# Patient Record
(demographics unavailable — no encounter records)

---

## 2025-01-28 NOTE — DISCUSSION/SUMMARY
[Medication Risks Reviewed] : Medication risks reviewed [Surgical risks reviewed] : Surgical risks reviewed [de-identified] : 70 year-old female with severe right knee osteoarthritis presenting today for follow up. The patient has had good result with conservative therapy in the past and would like continue with that today. We discussed cortisone vs. viscosupplementation. The patient would like to proceed with cortisone.  I gave her injection of cortisone in the right knee , which she tolerated well. I recommended physical therapy. She should take Tylenol as needed for the pain. She should follow up in 3 months for repeat evaluation possible viscosupplementation.  All addressed, patient in agreement.

## 2025-01-28 NOTE — PROCEDURE
[de-identified] : I injected the right knee  I discussed at length with the patient the planned steroid and lidocaine injection. The risks, benefits, convalescence and alternatives were reviewed. The possible side effects discussed included but were not limited to: pain, swelling, heat, bleeding, and redness. Symptoms are generally mild but if they are extensive then contact the office. Giving pain relievers by mouth such as NSAIDs or Tylenol can generally treat the reactions to steroid and lidocaine. Rare cases of infection have been noted. Rash, hives and itching may occur post injection. If you have muscle pain or cramps, flushing and or swelling of the face, rapid heart beat, nausea, dizziness, fever, chills, headache, difficulty breathing, swelling in the arms or legs, or have a prickly feeling of your skin, contact a health care provider immediately. Following this discussion, the knee was prepped with Alcohol and under sterile condition the 80 mg Depo-Medrol and 6 cc Lidocaine injection was performed with a 20 gauge needle through a superolateral injection site. The needle was introduced into the joint, aspiration was performed to ensure intra-articular placement and the medication was injected. Upon withdrawal of the needle the site was cleaned with alcohol and a band aid applied. The patient tolerated the injection well and there were no adverse effects. Post injection instructions included no strenuous activity for 24 hours, cryotherapy and if there are any adverse effects to contact the office.

## 2025-01-28 NOTE — HISTORY OF PRESENT ILLNESS
[de-identified] : 68-year-old female with past medical history of hypertension, hyperlipidemia, lichen planus, melanoma, which spread to the lymph nodes treated with immune therapy and a stroke (2016) with residual left-sided weakness on Eliquis and past surgical history of left total knee arthroplasty (2014) presents office for follow up of right knee pain and swelling. The patient was last seen in 2022 and received a cortisone injection, which provided good relief. She states that she relies on her RLE for support as it is her stronger leg. This has lead to increase in pain recently. Made worse by standing, rising from a seated position, ambulating and navigating stairs. She ambulates with a cane. Not currently in PT. Takes Tylenol occasionally for pain as she is unable to take NSAIDs due to use of Eliquis. Denies any recent injuries/falls/trauma, neurovascular compromise, locking/catching/buckling in knee.

## 2025-01-28 NOTE — PHYSICAL EXAM
[Cane] : ambulates with cane [de-identified] : Right knee exam shows mild effusion, ROM is 0-105 degrees, no instability, no pain with Gee, and medial lateral joint line tenderness. 5/5 motor strength in bilateral lower extremities. Sensory: Intact in bilateral lower extremities. DTRs: Biceps, brachioradialis, triceps, patellar, ankle and plantar 2+ and symmetric bilaterally. Pulses: dorsalis pedis, posterior tibial, femoral, popliteal, and radial 2+ and symmetric bilaterally. [de-identified] : 4 views of the right knee obtained the office today show no acute fracture or dislocation. There is severe lateral joint space narrowing bone-on-bone osteoarthritis subchondral sclerosis consistent with Kellgren-Hosea grade 4 changes.

## 2025-03-12 NOTE — CONSULT LETTER
[Dear  ___] : Dear  [unfilled], [Courtesy Letter:] : I had the pleasure of seeing your patient, [unfilled], in my office today. [Please see my note below.] : Please see my note below. [Consult Closing:] : Thank you very much for allowing me to participate in the care of this patient.  If you have any questions, please do not hesitate to contact me. [Sincerely,] : Sincerely, [DrMatthew  ___] : Dr. WHITLEY [DrMatthew ___] : Dr. WHITLEY [___] : [unfilled]

## 2025-03-12 NOTE — ASSESSMENT
[With Patient/Caregiver] : With Patient/Caregiver [Designated Health Care Proxy] : Designated Health Care Proxy [Name: ___] : Name: [unfilled] [Relationship: ___] : Relationship: [unfilled] [FreeTextEntry1] : Melanoma of back (172.5) (C43.59) Patient has nodular, non-pigmented mid-back melanoma. Lesion was biopsied on 7/14/21 and is at least 1.2 mm, ulcerated melanoma. Patient had surgery on 10/6/21 with Wide local excisIon WLE of midback and left axilla. T4bN1a melanoma, which is Stage 3C.   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  Last CAT scan 12/2024 negative for relapse. Hernia noted with small bowel. Dr Orona notified and did not think any intervention since asymptomatic. Patient remains asymptomatic from hernia,  Her rash is improving and thinks she might be able to stop Dupixent eventually.  Will continue surveillance of the patient with CAT CAP & US of left axilla q6 months until end of 2026. Continue physical exams with dermatologist.  RTO q6 Months (after each future scan) Perhasps she can see me same days as Dr Orona in jult 2025. time = 25 min. [AdvancecareDate] : 8/14/24

## 2025-03-12 NOTE — PHYSICAL EXAM
[Restricted in physically strenuous activity but ambulatory and able to carry out work of a light or sedentary nature] : Status 1- Restricted in physically strenuous activity but ambulatory and able to carry out work of a light or sedentary nature, e.g., light house work, office work [Normal] : affect appropriate [de-identified] : rash is primarily on the lower legs and is much improved; melanoma site on back and left axilla negative.

## 2025-03-12 NOTE — HISTORY OF PRESENT ILLNESS
[de-identified] : Mrs. Trevino is a 67 year old female with past medical history of CAD, mitral valve stent, A.Fib on Eliquis, Aortic Stenosis, CVA in 2017 OA presenting to the office for an initial consultation of melanoma.  Patient noticed a "cyst" on her back that she had been aware of since March 2021 It occasionally was rubbed by her bra, and would bleed. She states that there was a prior "birthmark" in that area. Persistence of the signs and symptoms led to dermatologic evaluation, biopsy, and the above diagnosis of melanoma.  Lesion was biopsied on 7/14/21 and is at least 1.2 mm, ulcerated melanoma.  Subsequent PET scan on 8/19/21 identified the following areas of concern: 1. Left iliac and inguinal adenopathy. 2. Equivocal findings of the thyroid. Thyroid sonogram is being arranged. 3. Thickening of the sigmoid colon. Patient will require follow up with GI and colonoscopy.  Patient is scheduled for US guided biopsy of left iliac node and US of thyroid on 9/1/2021.  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 Stroke affected left face, arm and leg in terms of weakness and speech. Her balance remains impaired and does use a cane to wall.  Patient is JEHOVAH WITNESS: She CANNOT take whole blood, red cells, white cells, platelets, or blood plasma. She CAN accept non-blood volume expanders (dextran, saline, Ringer's) and other non-blood management.  9/1/21 Patient with melanoma undergoing continued evaluation of disease sites in groin and axilla. Will have biopsies today. She has fully reviewed the consent form at home. We have re-reviewed today, and all questions answered. A consent process was performed today, and consent was signed with the patient, her sister as witness, and research RN.  10/22/21 Patient had surgery on 10/6/21 with WLE of midback and left axilla. T4bN1a melanoma, which is Stage 3C. Adjuvant therapy with PD1 inhibitor for one year is recommended. Will reduce relative risk of relapse by ~50%. The 6:00 margin might need to be revised, and I will discuss with Dr Orona. Will go for booster COVID in a couple weeks. Will also go for influenza vaccine next week.  12/3/2021: 1) Melanoma: Patient is here for C2 Nivolumab. She tolerated treatment relatively well however had some minor complaints which have all subsided. Admits to mild abdominal pain which has subsided. Patient had this pain in the past. She denies diarrhea. Accompanied with abdominal pain was nausea. She took mary with relief. Denies fever, chills, rash, SOB, chest pain, myalgia, arthralgia or change in appetite. 2) Vaccines: Patient received Moderna booster on 11/3/2021 and Influenza on 11/1/2021.  12/30/2021 Went for blood work on 12/27/21. This demonstrated further increase of LFTs, and eosinophils. Surgery of mid back and left axilla on 10/6/21. She denies alcohol use. Cardiologist increased metoprolol. On and off Abx from August-October 2021 for left foot cellulitis between the toes and heel. She was prescribed steroids, and given Abx by GP and dermatologist. She was also on topical Abx creams. No tx today.  1/28/2022: 1) Melanoma: Due to elevated liver enzymes, Nivolumab currently on hold. Patient was evaluated by hepatology. W/U has been negative for acute and chronic liver disease other then fatty liver seen on US. Patient was on Lipitor and Cascara which perhaps can be the culprit. After DC medication, her LFTs have been trending downwards. She is scheduled for repeat liver functions x 2 weeks and Fibroscan to assess for NAFLD. Otherwise she is feeling well and voices no new complaints. Denies increase in fatigue, abdominal pain, diarrhea, cough, myalgia, arthralgia.  3/4/2022: 1) Melanoma: Due to hepatitis, Nivolumab was on hold. Her last treatment was on 12/3/2021, cycle 2. She is doing well and voices no complaints. She remains active and has no restrictions with her ADLs. Patient was evaluated by hepatology, cleared to resume check point inhibitors. She was originally on Lipitor which was changed to Crestor. HCTZ switched to Lasix.  4/1/2022: 1) Melanoma: Patient is here for C4 Nivolumab today. Due to autoimmune hepatitis, treatment was delayed. She receive C3 Nivolumab on 3/5/2022. Her LFTS are now WNL. She has history of eczema which has not flared up while on treatment. SHe is applying topical creams to the affected area and is being followed by dermatologist. She is currently on Synthroid 50 mcg daily which was increased by her internist. Will repeat LFTs today STAT prior to immunotherapy.  4/29/2022: C5 Nivolumab today. She is tolerating treatment well and reports no significant irAEs. We reviewed her CMP, her LFTs are WNL. Will continue to get CMP one week prior to her infusions. Her left axillary US performed on 4/2022 was WNL. She is currently on Synthyroid 125 mcg daily which was increased by her internist.  5/27/2022: C6 Nivolumab today. She underwent labs on 5/25/2022 which reveal no acute abnormalities. Patient has noticed her eczema has flared up while on treatment especially in the right lower extremity. US duplex of LE was performed due to swelling, negative for thrombus. She was Rx Triamcinolone 0.1 % apply BID, Keven Lange. Patient had a non-healing sore which was biopsied and was negative for malignancy. She was given Doxycycline which she completed. Patient is applying a surgical wrap with zinc pad 3-4 times/week from wound care, Dr. Christiano Farah. The wound is healing appropriately and is cleared by wound care. She has follow up with him on Tuesday, 5/31/2022.  6/24/2022: Melanoma: C7 Nivolumab today. lichenoid eruption: Patient was evaluated by her dermatologist, Dr. Rice for a new rash noted on her right arm and right thigh. She has history of eczema. Dr. 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 He was recommending to start patient on MTX weekly vs Acitretin + UV light. Due to history of hepatitis, would not recommend MTX. Patient will start UV light therapy next week x 3 times/week. She is currently on Halobetasol BID with mild relief. SHe has involvement in her bilateral arms and upper chest. No other areas involved.  7/22/2022: C8 Nivolumab today. Patient performed labs on 7/20/2022 which reveal no acute abnormality. LFSs were WNL. Lichen planus: Acitretin 25mg daily and light therapy three times/week. The lesions on her bilateral arms and legs are responding to treatment. Patient has follow up with Dermatology in 3 weeks. She underwent TTE on 7/2022 which revealed no change from TTE on 3/2022.  9/16/2022: C10 Nivolumab today. She is doing well today. Labs from 8/18/2022 revealed no acute abnormality. LFTS remain WNL. CT due for 12/2022. Lichen Planus: The lesions on her left arm is responding appropriately to light therapy 3 times/week. The right arm lesions are much flatter and is responding well.  10/14/22 Here for C11 today. The 6:00 margin might need to be revised, and I previously discussed with Dr Orona. He will do this 1/2023 C12 on 11/11 and then C13 12/9 Dr. 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 He was recommending to start patient on Acitretin + UV light. She could not tolerate Acitretin She feels well enough to continue PD1 and plans to do so.  11/11/22: C12 Nivolumab today. Doing well on treatment and voices no major irAEs. Labs performed on 11/10 stable. LFTs and renal functions WNL. Patient developed sinus infection and was started on Augmentin. She is on D#7/10. Covid + Influenza negative. Lichenoid eruptions: Patient is currently UV light therapy 3 times/week. She completed Acitretin x 2 months.  12/9/2022: C13 Nivolumab today (last treatment) CT chest, abdomen/pelvis scheduled for 12/14. Patient is scheduled for revision of the 6:00 margin with Dr. Orona and Dr. Reynolds on 01/04/2023. Patient is scheduled to be evaluated by dermatology at the end of 01/2023. Patient has not responded robustly UV light therapy and Acitretin. She will likely need to start new therapy. We reviewed her labs from 12/08/2022, no acute abnormalities noted.  03/10/2023 Patient is currently here for routine surveillance follow up. Last imaging on 12/2022 revealed no evidence of CA. Next CT on 06/2023 Next US of left axillae on 04/2023 Lichenoid eruptions: Patient is did not respond to UV light + Acitretin. She has bilateral lesions on her arms and lower legs which is impairing her QOL. She is being followed by dermatology (Dr. Chou) and was started on Otezla 30 mg. She has been on it for 30 days and hot not seen a response however will continue. If Otezla is non-effective dermatology will consider Cosentyx.  6/30/23 Skin rash: On 6/21/23, Dupilumab started 600 mg once (given as two 300 mg injections), followed by 300 mg once every other week. This has been working very well in the last 10 days and she is doing much better. Of note, Dupilumab is a human monoclonal IgG4 antibody that inhibits interleukin-4 (IL-4) and interleukin-13 (IL-13) signaling by binding to the IL-4Ra subunit.  12/22/2023 Still on the Dupixent for the eczema, helping significantly. No new symptoms, no new lesions.    8/14/24 -6/5/24 CT CAP No evidence of new disease. Stable right lower lobe 7mm pulmonary nodule.  Decrease in size of right inguinal reference lymph node and stable right obturator lymph node. Stable cystic change involving both adnexa. - 6/5/24 Left axillary Ultrasound Unremarkable She endorsed improvement of lichen skin rashes with Dupixent.  She reports that she had an abnormal stress test then her cardiologist had cardio cath for her on 6/31/24 without stent placement. She has been on eliquis for Afib. Her metoprolol dose was reduced to 25mg/day due to bradycardia.   3/12/2025 Patient with melanoma of back and left axilla Had wide excision 10/2021 Last CAT scan 12/2024 negative for relapse. Hernia noted with small bowel. Dr Orona notified and did not think any intervention since asymptomatic. Patient remains asymptomatic from hernia, Her rash is improving and thinks she might be able to stop Dupixent eventually. [de-identified] : Surgical Oncology: Ollie Orona Dermatology: Keven Rice Internist: Merrill Sheikh Cardiology: Campbell Maria Hepatology: Lani Jasso  patient cell [phone] Sister Henrietta - [phone].

## 2025-03-12 NOTE — REASON FOR VISIT
[Follow-Up Visit] : a follow-up [Home] : at home, [unfilled] , at the time of the visit. [Medical Office: ([facility])___] : at the medical office located in  [Telehealth (audio & video)] : This visit was provided via telehealth using real-time 2-way audio visual technology. [FreeTextEntry2] : Melanoma

## 2025-04-29 NOTE — HISTORY OF PRESENT ILLNESS
[de-identified] : 68-year-old female with past medical history of hypertension, hyperlipidemia, lichen planus, melanoma, which spread to the lymph nodes treated with immune therapy and a stroke (2016) with residual left-sided weakness on Eliquis and past surgical history of left total knee arthroplasty (2014) presents office for follow up of right knee pain and swelling. The patient was last seen in January 2025 and received a cortisone injection, which provided good relief. She states that she relies on her RLE for support as it is her stronger leg. This has lead to increase in pain recently. Made worse by standing, rising from a seated position, ambulating and navigating stairs. She ambulates with a cane. She was previously in PT but has now taken a break. Takes Tylenol occasionally for pain as she is unable to take NSAIDs due to use of Eliquis. Denies any recent injuries/falls/trauma, neurovascular compromise, locking/catching/buckling in knee.

## 2025-04-29 NOTE — PHYSICAL EXAM
[de-identified] : Musculoskeletal: ambulates with cane . Right knee exam shows mild effusion, ROM is 0-105 degrees, no instability, no pain with Gee, and medial lateral joint line tenderness. 5/5 motor strength in bilateral lower extremities. Sensory: Intact in bilateral lower extremities. DTRs: Biceps, brachioradialis, triceps, patellar, ankle and plantar 2+ and symmetric bilaterally. Pulses: dorsalis pedis, posterior tibial, femoral, popliteal, and radial 2+ and symmetric bilaterally.

## 2025-04-29 NOTE — DISCUSSION/SUMMARY
[de-identified] : Medication risks reviewed. Surgical risks reviewed. 70 year-old female with severe right knee osteoarthritis presenting today for follow up. The patient has had good result with conservative therapy in the past and would like continue with that today. I gave her injection of cortisone in the right knee , which she tolerated well. She should take Tylenol as needed for the pain. She should follow up in 3 months for repeat evaluation. All questions addressed, patient in agreement.

## 2025-04-29 NOTE — PROCEDURE
[de-identified] : I injected the right knee  I discussed at length with the patient the planned steroid and lidocaine injection. The risks, benefits, convalescence and alternatives were reviewed. The possible side effects discussed included but were not limited to: pain, swelling, heat, bleeding, and redness. Symptoms are generally mild but if they are extensive then contact the office. Giving pain relievers by mouth such as NSAIDs or Tylenol can generally treat the reactions to steroid and lidocaine. Rare cases of infection have been noted. Rash, hives and itching may occur post injection. If you have muscle pain or cramps, flushing and or swelling of the face, rapid heart beat, nausea, dizziness, fever, chills, headache, difficulty breathing, swelling in the arms or legs, or have a prickly feeling of your skin, contact a health care provider immediately. Following this discussion, the knee was prepped with Alcohol and under sterile condition the 80 mg Depo-Medrol and 6 cc Lidocaine injection was performed with a 20 gauge needle through a superolateral injection site. The needle was introduced into the joint, aspiration was performed to ensure intra-articular placement and the medication was injected. Upon withdrawal of the needle the site was cleaned with alcohol and a band aid applied. The patient tolerated the injection well and there were no adverse effects. Post injection instructions included no strenuous activity for 24 hours, cryotherapy and if there are any adverse effects to contact the office.

## 2025-07-21 NOTE — BEGINNING OF VISIT
[0] : 2) Feeling down, depressed, or hopeless: Not at all (0) [PHQ-2 Negative] : PHQ-2 Negative [PHQ-9 Negative] : PHQ-9 Negative [[id number]] : 0 [Never] : Never [Date Discussed (MM/DD/YY): ___] : Discussed: [unfilled] [With Patient/Caregiver] : with Patient/Caregiver

## 2025-07-21 NOTE — PHYSICAL EXAM
[Restricted in physically strenuous activity but ambulatory and able to carry out work of a light or sedentary nature] : Status 1- Restricted in physically strenuous activity but ambulatory and able to carry out work of a light or sedentary nature, e.g., light house work, office work [Normal] : affect appropriate [de-identified] : rash is primarily on the lower legs and is much improved; melanoma site on back and left axilla negative.

## 2025-07-21 NOTE — REASON FOR VISIT
[Follow-Up Visit] : a follow-up [Home] : at home, [unfilled] , at the time of the visit. [Medical Office: ([facility])___] : at the medical office located in  [Telehealth (audio & video)] : This visit was provided via telehealth using real-time 2-way audio visual technology. [Verbal consent obtained from patient] : the patient, [unfilled] [FreeTextEntry2] : Melanoma

## 2025-07-21 NOTE — ASSESSMENT
[With Patient/Caregiver] : With Patient/Caregiver [Designated Health Care Proxy] : Designated Health Care Proxy [Name: ___] : Name: [unfilled] [Relationship: ___] : Relationship: [unfilled] [FreeTextEntry1] : Melanoma of back (172.5) (C43.59) Patient has nodular, non-pigmented mid-back melanoma. Lesion was biopsied on 7/14/21 and is at least 1.2 mm, ulcerated melanoma. Patient had surgery on 10/6/21 with Wide local excisIon WLE of midback and left axilla. T4bN1a melanoma, which is Stage 3C.   C1 Nivolumab 11/5/2021 C13 12/9/22 (last treatment) Will continue surveillance of the patient with CAT CAP & US of left axilla q6 months until end of 2026. Next scans in December 2025- ordered.  Continue physical exams with dermatologist and management of her lichenoid   Labs at local [facility] on 7/30  RTO q6 Months (after each future scan) - Dec 2025/Jan 2026  Dr. Petty agrees with above plan.  [AdvancecareDate] : 8/14/24

## 2025-07-21 NOTE — CONSULT LETTER
[Dear  ___] : Dear  [unfilled], [Courtesy Letter:] : I had the pleasure of seeing your patient, [unfilled], in my office today. [Please see my note below.] : Please see my note below. [Consult Closing:] : Thank you very much for allowing me to participate in the care of this patient.  If you have any questions, please do not hesitate to contact me. [Sincerely,] : Sincerely, [DrMathtew  ___] : Dr. WHITLEY [DrMatthew ___] : Dr. WHITLEY [___] : [unfilled]

## 2025-07-21 NOTE — BEGINNING OF VISIT
[0] : 2) Feeling down, depressed, or hopeless: Not at all (0) [PHQ-2 Negative] : PHQ-2 Negative [PHQ-9 Negative] : PHQ-9 Negative [[id number]] : 0 [Never] : Never [Date Discussed (MM/DD/YY): ___] : Discussed: [unfilled] [With Patient/Caregiver] : with Patient/Caregiver

## 2025-07-21 NOTE — HISTORY OF PRESENT ILLNESS
[de-identified] : Mrs. Trevino is a 67 year old female with past medical history of CAD, mitral valve stent, A.Fib on Eliquis, Aortic Stenosis, CVA in 2017 OA presenting to the office for an initial consultation of melanoma.  Patient noticed a "cyst" on her back that she had been aware of since March 2021 It occasionally was rubbed by her bra, and would bleed. She states that there was a prior "birthmark" in that area. Persistence of the signs and symptoms led to dermatologic evaluation, biopsy, and the above diagnosis of melanoma.  Lesion was biopsied on 7/14/21 and is at least 1.2 mm, ulcerated melanoma.  Subsequent PET scan on 8/19/21 identified the following areas of concern: 1. Left iliac and inguinal adenopathy. 2. Equivocal findings of the thyroid. Thyroid sonogram is being arranged. 3. Thickening of the sigmoid colon. Patient will require follow up with GI and colonoscopy.  Patient is scheduled for US guided biopsy of left iliac node and US of thyroid on 9/1/2021.  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 Stroke affected left face, arm and leg in terms of weakness and speech. Her balance remains impaired and does use a cane to wall.  Patient is JEHOVAH WITNESS: She CANNOT take whole blood, red cells, white cells, platelets, or blood plasma. She CAN accept non-blood volume expanders (dextran, saline, Ringer's) and other non-blood management.  9/1/21 Patient with melanoma undergoing continued evaluation of disease sites in groin and axilla. Will have biopsies today. She has fully reviewed the consent form at home. We have re-reviewed today, and all questions answered. A consent process was performed today, and consent was signed with the patient, her sister as witness, and research RN.  10/22/21 Patient had surgery on 10/6/21 with WLE of midback and left axilla. T4bN1a melanoma, which is Stage 3C. Adjuvant therapy with PD1 inhibitor for one year is recommended. Will reduce relative risk of relapse by ~50%. The 6:00 margin might need to be revised, and I will discuss with Dr Orona. Will go for booster COVID in a couple weeks. Will also go for influenza vaccine next week.  12/3/2021: 1) Melanoma: Patient is here for C2 Nivolumab. She tolerated treatment relatively well however had some minor complaints which have all subsided. Admits to mild abdominal pain which has subsided. Patient had this pain in the past. She denies diarrhea. Accompanied with abdominal pain was nausea. She took mary with relief. Denies fever, chills, rash, SOB, chest pain, myalgia, arthralgia or change in appetite. 2) Vaccines: Patient received Moderna booster on 11/3/2021 and Influenza on 11/1/2021.  12/30/2021 Went for blood work on 12/27/21. This demonstrated further increase of LFTs, and eosinophils. Surgery of mid back and left axilla on 10/6/21. She denies alcohol use. Cardiologist increased metoprolol. On and off Abx from August-October 2021 for left foot cellulitis between the toes and heel. She was prescribed steroids, and given Abx by GP and dermatologist. She was also on topical Abx creams. No tx today.  1/28/2022: 1) Melanoma: Due to elevated liver enzymes, Nivolumab currently on hold. Patient was evaluated by hepatology. W/U has been negative for acute and chronic liver disease other then fatty liver seen on US. Patient was on Lipitor and Cascara which perhaps can be the culprit. After DC medication, her LFTs have been trending downwards. She is scheduled for repeat liver functions x 2 weeks and Fibroscan to assess for NAFLD. Otherwise she is feeling well and voices no new complaints. Denies increase in fatigue, abdominal pain, diarrhea, cough, myalgia, arthralgia.  3/4/2022: 1) Melanoma: Due to hepatitis, Nivolumab was on hold. Her last treatment was on 12/3/2021, cycle 2. She is doing well and voices no complaints. She remains active and has no restrictions with her ADLs. Patient was evaluated by hepatology, cleared to resume check point inhibitors. She was originally on Lipitor which was changed to Crestor. HCTZ switched to Lasix.  4/1/2022: 1) Melanoma: Patient is here for C4 Nivolumab today. Due to autoimmune hepatitis, treatment was delayed. She receive C3 Nivolumab on 3/5/2022. Her LFTS are now WNL. She has history of eczema which has not flared up while on treatment. SHe is applying topical creams to the affected area and is being followed by dermatologist. She is currently on Synthroid 50 mcg daily which was increased by her internist. Will repeat LFTs today STAT prior to immunotherapy.  4/29/2022: C5 Nivolumab today. She is tolerating treatment well and reports no significant irAEs. We reviewed her CMP, her LFTs are WNL. Will continue to get CMP one week prior to her infusions. Her left axillary US performed on 4/2022 was WNL. She is currently on Synthyroid 125 mcg daily which was increased by her internist.  5/27/2022: C6 Nivolumab today. She underwent labs on 5/25/2022 which reveal no acute abnormalities. Patient has noticed her eczema has flared up while on treatment especially in the right lower extremity. US duplex of LE was performed due to swelling, negative for thrombus. She was Rx Triamcinolone 0.1 % apply BID, Keven Lange. Patient had a non-healing sore which was biopsied and was negative for malignancy. She was given Doxycycline which she completed. Patient is applying a surgical wrap with zinc pad 3-4 times/week from wound care, Dr. Christiano Farah. The wound is healing appropriately and is cleared by wound care. She has follow up with him on Tuesday, 5/31/2022.  6/24/2022: Melanoma: C7 Nivolumab today. lichenoid eruption: Patient was evaluated by her dermatologist, Dr. Rice for a new rash noted on her right arm and right thigh. She has history of eczema. Dr. 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 He was recommending to start patient on MTX weekly vs Acitretin + UV light. Due to history of hepatitis, would not recommend MTX. Patient will start UV light therapy next week x 3 times/week. She is currently on Halobetasol BID with mild relief. SHe has involvement in her bilateral arms and upper chest. No other areas involved.  7/22/2022: C8 Nivolumab today. Patient performed labs on 7/20/2022 which reveal no acute abnormality. LFSs were WNL. Lichen planus: Acitretin 25mg daily and light therapy three times/week. The lesions on her bilateral arms and legs are responding to treatment. Patient has follow up with Dermatology in 3 weeks. She underwent TTE on 7/2022 which revealed no change from TTE on 3/2022.  9/16/2022: C10 Nivolumab today. She is doing well today. Labs from 8/18/2022 revealed no acute abnormality. LFTS remain WNL. CT due for 12/2022. Lichen Planus: The lesions on her left arm is responding appropriately to light therapy 3 times/week. The right arm lesions are much flatter and is responding well.  10/14/22 Here for C11 today. The 6:00 margin might need to be revised, and I previously discussed with Dr Orona. He will do this 1/2023 C12 on 11/11 and then C13 12/9 Dr. 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 He was recommending to start patient on Acitretin + UV light. She could not tolerate Acitretin She feels well enough to continue PD1 and plans to do so.  11/11/22: C12 Nivolumab today. Doing well on treatment and voices no major irAEs. Labs performed on 11/10 stable. LFTs and renal functions WNL. Patient developed sinus infection and was started on Augmentin. She is on D#7/10. Covid + Influenza negative. Lichenoid eruptions: Patient is currently UV light therapy 3 times/week. She completed Acitretin x 2 months.  12/9/2022: C13 Nivolumab today (last treatment) CT chest, abdomen/pelvis scheduled for 12/14. Patient is scheduled for revision of the 6:00 margin with Dr. Orona and Dr. Reynolds on 01/04/2023. Patient is scheduled to be evaluated by dermatology at the end of 01/2023. Patient has not responded robustly UV light therapy and Acitretin. She will likely need to start new therapy. We reviewed her labs from 12/08/2022, no acute abnormalities noted.  03/10/2023 Patient is currently here for routine surveillance follow up. Last imaging on 12/2022 revealed no evidence of CA. Next CT on 06/2023 Next US of left axillae on 04/2023 Lichenoid eruptions: Patient is did not respond to UV light + Acitretin. She has bilateral lesions on her arms and lower legs which is impairing her QOL. She is being followed by dermatology (Dr. Chou) and was started on Otezla 30 mg. She has been on it for 30 days and hot not seen a response however will continue. If Otezla is non-effective dermatology will consider Cosentyx.  6/30/23 Skin rash: On 6/21/23, Dupilumab started 600 mg once (given as two 300 mg injections), followed by 300 mg once every other week. This has been working very well in the last 10 days and she is doing much better. Of note, Dupilumab is a human monoclonal IgG4 antibody that inhibits interleukin-4 (IL-4) and interleukin-13 (IL-13) signaling by binding to the IL-4Ra subunit.  12/22/2023 Still on the Dupixent for the eczema, helping significantly. No new symptoms, no new lesions.    8/14/24 -6/5/24 CT CAP No evidence of new disease. Stable right lower lobe 7mm pulmonary nodule.  Decrease in size of right inguinal reference lymph node and stable right obturator lymph node. Stable cystic change involving both adnexa. - 6/5/24 Left axillary Ultrasound Unremarkable She endorsed improvement of lichen skin rashes with Dupixent.  She reports that she had an abnormal stress test then her cardiologist had cardio cath for her on 6/31/24 without stent placement. She has been on eliquis for Afib. Her metoprolol dose was reduced to 25mg/day due to bradycardia.   3/12/2025 Patient with melanoma of back and left axilla Had wide excision 10/2021 Last CAT scan 12/2024 negative for relapse. Hernia noted with small bowel. Dr Orona notified and did not think any intervention since asymptomatic. Patient remains asymptomatic from hernia, Her rash is improving and thinks she might be able to stop Dupixent eventually.  7/21/25: TEB Patient doing well. No major complaints today. Remains active and no restrictions with her ADLs. Her last CT in June 2025 and US of left axilla negative for relapse. Will repeat both imaging in December 2025- ordered. For her lichenoid, she is being followed by dermatology. She was taken off Dupixent and currently on topicals. No new meds.  [de-identified] : Surgical Oncology: Ollie Orona Dermatology: Keven Rice Internist: Merrill Sheikh Cardiology: Campbell Maria Hepatology: Lani Jasso  patient cell [phone] Sister Henrietta - [phone].

## 2025-07-21 NOTE — HISTORY OF PRESENT ILLNESS
[de-identified] : Mrs. Trevino is a 67 year old female with past medical history of CAD, mitral valve stent, A.Fib on Eliquis, Aortic Stenosis, CVA in 2017 OA presenting to the office for an initial consultation of melanoma.  Patient noticed a "cyst" on her back that she had been aware of since March 2021 It occasionally was rubbed by her bra, and would bleed. She states that there was a prior "birthmark" in that area. Persistence of the signs and symptoms led to dermatologic evaluation, biopsy, and the above diagnosis of melanoma.  Lesion was biopsied on 7/14/21 and is at least 1.2 mm, ulcerated melanoma.  Subsequent PET scan on 8/19/21 identified the following areas of concern: 1. Left iliac and inguinal adenopathy. 2. Equivocal findings of the thyroid. Thyroid sonogram is being arranged. 3. Thickening of the sigmoid colon. Patient will require follow up with GI and colonoscopy.  Patient is scheduled for US guided biopsy of left iliac node and US of thyroid on 9/1/2021.  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 Stroke affected left face, arm and leg in terms of weakness and speech. Her balance remains impaired and does use a cane to wall.  Patient is JEHOVAH WITNESS: She CANNOT take whole blood, red cells, white cells, platelets, or blood plasma. She CAN accept non-blood volume expanders (dextran, saline, Ringer's) and other non-blood management.  9/1/21 Patient with melanoma undergoing continued evaluation of disease sites in groin and axilla. Will have biopsies today. She has fully reviewed the consent form at home. We have re-reviewed today, and all questions answered. A consent process was performed today, and consent was signed with the patient, her sister as witness, and research RN.  10/22/21 Patient had surgery on 10/6/21 with WLE of midback and left axilla. T4bN1a melanoma, which is Stage 3C. Adjuvant therapy with PD1 inhibitor for one year is recommended. Will reduce relative risk of relapse by ~50%. The 6:00 margin might need to be revised, and I will discuss with Dr Orona. Will go for booster COVID in a couple weeks. Will also go for influenza vaccine next week.  12/3/2021: 1) Melanoma: Patient is here for C2 Nivolumab. She tolerated treatment relatively well however had some minor complaints which have all subsided. Admits to mild abdominal pain which has subsided. Patient had this pain in the past. She denies diarrhea. Accompanied with abdominal pain was nausea. She took mary with relief. Denies fever, chills, rash, SOB, chest pain, myalgia, arthralgia or change in appetite. 2) Vaccines: Patient received Moderna booster on 11/3/2021 and Influenza on 11/1/2021.  12/30/2021 Went for blood work on 12/27/21. This demonstrated further increase of LFTs, and eosinophils. Surgery of mid back and left axilla on 10/6/21. She denies alcohol use. Cardiologist increased metoprolol. On and off Abx from August-October 2021 for left foot cellulitis between the toes and heel. She was prescribed steroids, and given Abx by GP and dermatologist. She was also on topical Abx creams. No tx today.  1/28/2022: 1) Melanoma: Due to elevated liver enzymes, Nivolumab currently on hold. Patient was evaluated by hepatology. W/U has been negative for acute and chronic liver disease other then fatty liver seen on US. Patient was on Lipitor and Cascara which perhaps can be the culprit. After DC medication, her LFTs have been trending downwards. She is scheduled for repeat liver functions x 2 weeks and Fibroscan to assess for NAFLD. Otherwise she is feeling well and voices no new complaints. Denies increase in fatigue, abdominal pain, diarrhea, cough, myalgia, arthralgia.  3/4/2022: 1) Melanoma: Due to hepatitis, Nivolumab was on hold. Her last treatment was on 12/3/2021, cycle 2. She is doing well and voices no complaints. She remains active and has no restrictions with her ADLs. Patient was evaluated by hepatology, cleared to resume check point inhibitors. She was originally on Lipitor which was changed to Crestor. HCTZ switched to Lasix.  4/1/2022: 1) Melanoma: Patient is here for C4 Nivolumab today. Due to autoimmune hepatitis, treatment was delayed. She receive C3 Nivolumab on 3/5/2022. Her LFTS are now WNL. She has history of eczema which has not flared up while on treatment. SHe is applying topical creams to the affected area and is being followed by dermatologist. She is currently on Synthroid 50 mcg daily which was increased by her internist. Will repeat LFTs today STAT prior to immunotherapy.  4/29/2022: C5 Nivolumab today. She is tolerating treatment well and reports no significant irAEs. We reviewed her CMP, her LFTs are WNL. Will continue to get CMP one week prior to her infusions. Her left axillary US performed on 4/2022 was WNL. She is currently on Synthyroid 125 mcg daily which was increased by her internist.  5/27/2022: C6 Nivolumab today. She underwent labs on 5/25/2022 which reveal no acute abnormalities. Patient has noticed her eczema has flared up while on treatment especially in the right lower extremity. US duplex of LE was performed due to swelling, negative for thrombus. She was Rx Triamcinolone 0.1 % apply BID, Keven Lange. Patient had a non-healing sore which was biopsied and was negative for malignancy. She was given Doxycycline which she completed. Patient is applying a surgical wrap with zinc pad 3-4 times/week from wound care, Dr. Christiano Farah. The wound is healing appropriately and is cleared by wound care. She has follow up with him on Tuesday, 5/31/2022.  6/24/2022: Melanoma: C7 Nivolumab today. lichenoid eruption: Patient was evaluated by her dermatologist, Dr. Rice for a new rash noted on her right arm and right thigh. She has history of eczema. Dr. 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 He was recommending to start patient on MTX weekly vs Acitretin + UV light. Due to history of hepatitis, would not recommend MTX. Patient will start UV light therapy next week x 3 times/week. She is currently on Halobetasol BID with mild relief. SHe has involvement in her bilateral arms and upper chest. No other areas involved.  7/22/2022: C8 Nivolumab today. Patient performed labs on 7/20/2022 which reveal no acute abnormality. LFSs were WNL. Lichen planus: Acitretin 25mg daily and light therapy three times/week. The lesions on her bilateral arms and legs are responding to treatment. Patient has follow up with Dermatology in 3 weeks. She underwent TTE on 7/2022 which revealed no change from TTE on 3/2022.  9/16/2022: C10 Nivolumab today. She is doing well today. Labs from 8/18/2022 revealed no acute abnormality. LFTS remain WNL. CT due for 12/2022. Lichen Planus: The lesions on her left arm is responding appropriately to light therapy 3 times/week. The right arm lesions are much flatter and is responding well.  10/14/22 Here for C11 today. The 6:00 margin might need to be revised, and I previously discussed with Dr Orona. He will do this 1/2023 C12 on 11/11 and then C13 12/9 Dr. 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 He was recommending to start patient on Acitretin + UV light. She could not tolerate Acitretin She feels well enough to continue PD1 and plans to do so.  11/11/22: C12 Nivolumab today. Doing well on treatment and voices no major irAEs. Labs performed on 11/10 stable. LFTs and renal functions WNL. Patient developed sinus infection and was started on Augmentin. She is on D#7/10. Covid + Influenza negative. Lichenoid eruptions: Patient is currently UV light therapy 3 times/week. She completed Acitretin x 2 months.  12/9/2022: C13 Nivolumab today (last treatment) CT chest, abdomen/pelvis scheduled for 12/14. Patient is scheduled for revision of the 6:00 margin with Dr. Orona and Dr. Reynolds on 01/04/2023. Patient is scheduled to be evaluated by dermatology at the end of 01/2023. Patient has not responded robustly UV light therapy and Acitretin. She will likely need to start new therapy. We reviewed her labs from 12/08/2022, no acute abnormalities noted.  03/10/2023 Patient is currently here for routine surveillance follow up. Last imaging on 12/2022 revealed no evidence of CA. Next CT on 06/2023 Next US of left axillae on 04/2023 Lichenoid eruptions: Patient is did not respond to UV light + Acitretin. She has bilateral lesions on her arms and lower legs which is impairing her QOL. She is being followed by dermatology (Dr. Chou) and was started on Otezla 30 mg. She has been on it for 30 days and hot not seen a response however will continue. If Otezla is non-effective dermatology will consider Cosentyx.  6/30/23 Skin rash: On 6/21/23, Dupilumab started 600 mg once (given as two 300 mg injections), followed by 300 mg once every other week. This has been working very well in the last 10 days and she is doing much better. Of note, Dupilumab is a human monoclonal IgG4 antibody that inhibits interleukin-4 (IL-4) and interleukin-13 (IL-13) signaling by binding to the IL-4Ra subunit.  12/22/2023 Still on the Dupixent for the eczema, helping significantly. No new symptoms, no new lesions.    8/14/24 -6/5/24 CT CAP No evidence of new disease. Stable right lower lobe 7mm pulmonary nodule.  Decrease in size of right inguinal reference lymph node and stable right obturator lymph node. Stable cystic change involving both adnexa. - 6/5/24 Left axillary Ultrasound Unremarkable She endorsed improvement of lichen skin rashes with Dupixent.  She reports that she had an abnormal stress test then her cardiologist had cardio cath for her on 6/31/24 without stent placement. She has been on eliquis for Afib. Her metoprolol dose was reduced to 25mg/day due to bradycardia.   3/12/2025 Patient with melanoma of back and left axilla Had wide excision 10/2021 Last CAT scan 12/2024 negative for relapse. Hernia noted with small bowel. Dr Orona notified and did not think any intervention since asymptomatic. Patient remains asymptomatic from hernia, Her rash is improving and thinks she might be able to stop Dupixent eventually.  7/21/25: TEB Patient doing well. No major complaints today. Remains active and no restrictions with her ADLs. Her last CT in June 2025 and US of left axilla negative for relapse. Will repeat both imaging in December 2025- ordered. For her lichenoid, she is being followed by dermatology. She was taken off Dupixent and currently on topicals. No new meds.  [de-identified] : Surgical Oncology: Ollie Orona Dermatology: Keven Rice Internist: Merrill Sheikh Cardiology: Campbell Maria Hepatology: Lani Jasso  patient cell [phone] Sister Henrietta - [phone].

## 2025-07-21 NOTE — REASON FOR VISIT
[Follow-Up Visit] : a follow-up [Home] : at home, [unfilled] , at the time of the visit. [Medical Office: ([facility])___] : at the medical office located in  [Telehealth (audio & video)] : This visit was provided via telehealth using real-time 2-way audio visual technology. [Verbal consent obtained from patient] : the patient, [unfilled] [FreeTextEntry2] : Melanoma

## 2025-07-21 NOTE — PHYSICAL EXAM
[Restricted in physically strenuous activity but ambulatory and able to carry out work of a light or sedentary nature] : Status 1- Restricted in physically strenuous activity but ambulatory and able to carry out work of a light or sedentary nature, e.g., light house work, office work [Normal] : affect appropriate [de-identified] : rash is primarily on the lower legs and is much improved; melanoma site on back and left axilla negative.

## 2025-07-21 NOTE — ASSESSMENT
[With Patient/Caregiver] : With Patient/Caregiver [Designated Health Care Proxy] : Designated Health Care Proxy [Name: ___] : Name: [unfilled] [Relationship: ___] : Relationship: [unfilled] [FreeTextEntry1] : Melanoma of back (172.5) (C43.59) Patient has nodular, non-pigmented mid-back melanoma. Lesion was biopsied on 7/14/21 and is at least 1.2 mm, ulcerated melanoma. Patient had surgery on 10/6/21 with Wide local excisIon WLE of midback and left axilla. T4bN1a melanoma, which is Stage 3C.   C1 Nivolumab 11/5/2021 C13 12/9/22 (last treatment) Will continue surveillance of the patient with CAT CAP & US of left axilla q6 months until end of 2026. Next scans in December 2025- ordered.  Continue physical exams with dermatologist and management of her lichenoid   Labs at local [facility] on 7/30  RTO q6 Months (after each future scan) - Dec 2025/Jan 2026  Dr. Petty agrees with above plan.  [AdvancecareDate] : 8/14/24